# Patient Record
Sex: FEMALE | Race: WHITE | NOT HISPANIC OR LATINO | Employment: FULL TIME | URBAN - NONMETROPOLITAN AREA
[De-identification: names, ages, dates, MRNs, and addresses within clinical notes are randomized per-mention and may not be internally consistent; named-entity substitution may affect disease eponyms.]

---

## 2024-11-06 ENCOUNTER — APPOINTMENT (EMERGENCY)
Dept: RADIOLOGY | Facility: HOSPITAL | Age: 44
End: 2024-11-06
Payer: COMMERCIAL

## 2024-11-06 ENCOUNTER — HOSPITAL ENCOUNTER (EMERGENCY)
Facility: HOSPITAL | Age: 44
Discharge: HOME | End: 2024-11-06
Attending: EMERGENCY MEDICINE
Payer: COMMERCIAL

## 2024-11-06 VITALS
DIASTOLIC BLOOD PRESSURE: 63 MMHG | SYSTOLIC BLOOD PRESSURE: 99 MMHG | HEART RATE: 75 BPM | WEIGHT: 206.13 LBS | OXYGEN SATURATION: 99 % | RESPIRATION RATE: 15 BRPM | TEMPERATURE: 97.9 F

## 2024-11-06 DIAGNOSIS — R07.9 CHEST PAIN: ICD-10-CM

## 2024-11-06 DIAGNOSIS — E01.0 THYROMEGALY: ICD-10-CM

## 2024-11-06 DIAGNOSIS — R00.2 PALPITATIONS: Primary | ICD-10-CM

## 2024-11-06 LAB
ALBUMIN SERPL BCG-MCNC: 4.8 G/DL (ref 3.5–5)
ALP SERPL-CCNC: 96 U/L (ref 34–104)
ALT SERPL W P-5'-P-CCNC: 26 U/L (ref 7–52)
ANION GAP SERPL CALCULATED.3IONS-SCNC: 8 MMOL/L (ref 4–13)
AST SERPL W P-5'-P-CCNC: 23 U/L (ref 13–39)
ATRIAL RATE: 86 BPM
BASOPHILS # BLD AUTO: 0.05 THOUSANDS/ΜL (ref 0–0.1)
BASOPHILS NFR BLD AUTO: 1 % (ref 0–1)
BILIRUB SERPL-MCNC: 0.36 MG/DL (ref 0.2–1)
BNP SERPL-MCNC: 18 PG/ML (ref 0–100)
BUN SERPL-MCNC: 13 MG/DL (ref 5–25)
CALCIUM SERPL-MCNC: 9.8 MG/DL (ref 8.4–10.2)
CARDIAC TROPONIN I PNL SERPL HS: <2 NG/L
CHLORIDE SERPL-SCNC: 107 MMOL/L (ref 96–108)
CO2 SERPL-SCNC: 25 MMOL/L (ref 21–32)
CREAT SERPL-MCNC: 0.8 MG/DL (ref 0.6–1.3)
D DIMER PPP FEU-MCNC: 0.28 UG/ML FEU
EOSINOPHIL # BLD AUTO: 0.26 THOUSAND/ΜL (ref 0–0.61)
EOSINOPHIL NFR BLD AUTO: 3 % (ref 0–6)
ERYTHROCYTE [DISTWIDTH] IN BLOOD BY AUTOMATED COUNT: 13.5 % (ref 11.6–15.1)
GFR SERPL CREATININE-BSD FRML MDRD: 89 ML/MIN/1.73SQ M
GLUCOSE SERPL-MCNC: 101 MG/DL (ref 65–140)
HCT VFR BLD AUTO: 41.8 % (ref 34.8–46.1)
HGB BLD-MCNC: 13.7 G/DL (ref 11.5–15.4)
IMM GRANULOCYTES # BLD AUTO: 0.01 THOUSAND/UL (ref 0–0.2)
IMM GRANULOCYTES NFR BLD AUTO: 0 % (ref 0–2)
LYMPHOCYTES # BLD AUTO: 2.76 THOUSANDS/ΜL (ref 0.6–4.47)
LYMPHOCYTES NFR BLD AUTO: 36 % (ref 14–44)
MAGNESIUM SERPL-MCNC: 2.3 MG/DL (ref 1.9–2.7)
MCH RBC QN AUTO: 30.6 PG (ref 26.8–34.3)
MCHC RBC AUTO-ENTMCNC: 32.8 G/DL (ref 31.4–37.4)
MCV RBC AUTO: 94 FL (ref 82–98)
MONOCYTES # BLD AUTO: 0.63 THOUSAND/ΜL (ref 0.17–1.22)
MONOCYTES NFR BLD AUTO: 8 % (ref 4–12)
NEUTROPHILS # BLD AUTO: 3.99 THOUSANDS/ΜL (ref 1.85–7.62)
NEUTS SEG NFR BLD AUTO: 52 % (ref 43–75)
NRBC BLD AUTO-RTO: 0 /100 WBCS
P AXIS: 49 DEGREES
PLATELET # BLD AUTO: 286 THOUSANDS/UL (ref 149–390)
PMV BLD AUTO: 11.1 FL (ref 8.9–12.7)
POTASSIUM SERPL-SCNC: 3.8 MMOL/L (ref 3.5–5.3)
PR INTERVAL: 162 MS
PROT SERPL-MCNC: 7.8 G/DL (ref 6.4–8.4)
QRS AXIS: 19 DEGREES
QRSD INTERVAL: 82 MS
QT INTERVAL: 366 MS
QTC INTERVAL: 437 MS
RBC # BLD AUTO: 4.47 MILLION/UL (ref 3.81–5.12)
SODIUM SERPL-SCNC: 140 MMOL/L (ref 135–147)
T WAVE AXIS: 46 DEGREES
TSH SERPL DL<=0.05 MIU/L-ACNC: 4.03 UIU/ML (ref 0.45–4.5)
VENTRICULAR RATE: 86 BPM
WBC # BLD AUTO: 7.7 THOUSAND/UL (ref 4.31–10.16)

## 2024-11-06 PROCEDURE — 93005 ELECTROCARDIOGRAM TRACING: CPT

## 2024-11-06 PROCEDURE — 84443 ASSAY THYROID STIM HORMONE: CPT | Performed by: EMERGENCY MEDICINE

## 2024-11-06 PROCEDURE — 93010 ELECTROCARDIOGRAM REPORT: CPT | Performed by: INTERNAL MEDICINE

## 2024-11-06 PROCEDURE — 85025 COMPLETE CBC W/AUTO DIFF WBC: CPT | Performed by: EMERGENCY MEDICINE

## 2024-11-06 PROCEDURE — 36415 COLL VENOUS BLD VENIPUNCTURE: CPT | Performed by: EMERGENCY MEDICINE

## 2024-11-06 PROCEDURE — 99285 EMERGENCY DEPT VISIT HI MDM: CPT

## 2024-11-06 PROCEDURE — 71045 X-RAY EXAM CHEST 1 VIEW: CPT

## 2024-11-06 PROCEDURE — 83735 ASSAY OF MAGNESIUM: CPT | Performed by: EMERGENCY MEDICINE

## 2024-11-06 PROCEDURE — 85379 FIBRIN DEGRADATION QUANT: CPT | Performed by: EMERGENCY MEDICINE

## 2024-11-06 PROCEDURE — 99285 EMERGENCY DEPT VISIT HI MDM: CPT | Performed by: EMERGENCY MEDICINE

## 2024-11-06 PROCEDURE — 84484 ASSAY OF TROPONIN QUANT: CPT | Performed by: EMERGENCY MEDICINE

## 2024-11-06 PROCEDURE — 83880 ASSAY OF NATRIURETIC PEPTIDE: CPT | Performed by: EMERGENCY MEDICINE

## 2024-11-06 PROCEDURE — 80053 COMPREHEN METABOLIC PANEL: CPT | Performed by: EMERGENCY MEDICINE

## 2024-11-06 NOTE — Clinical Note
Keysha Figueroa was seen and treated in our emergency department on 11/6/2024.                Diagnosis:     Keysha  .    She may return on this date: 11/07/2024         If you have any questions or concerns, please don't hesitate to call.      Pilo Rea MD    ______________________________           _______________          _______________  Hospital Representative                              Date                                Time

## 2024-11-06 NOTE — ED PROVIDER NOTES
Time reflects when diagnosis was documented in both MDM as applicable and the Disposition within this note       Time User Action Codes Description Comment    11/6/2024 12:30 PM ReaPilo Add [R00.2] Palpitations     11/6/2024 12:31 PM ReaPilo Add [R07.9] Chest pain     11/6/2024 12:31 PM Pilo Rea Add [E01.0] Thyromegaly           ED Disposition       ED Disposition   Discharge    Condition   Stable    Date/Time   Wed Nov 6, 2024 12:30 PM    Comment   Keysha Figueroa discharge to home/self care.                   Assessment & Plan       Medical Decision Making  1131: Patient appears well, vital signs reviewed.  Plan to complete basic labs including cardiac enzymes, BNP, D-dimer, check EKG and chest x-ray.  Patient also noted to have some mild thyromegaly on exam, check TSH.  Placed on the monitor, plan to observe for any dysrhythmia.    1240: Chest x-ray and labs reviewed.  The patient has remained stable throughout ED course.  Stable for discharge.  Close follow-up with PCP.    Amount and/or Complexity of Data Reviewed  Labs: ordered.  Radiology: ordered and independent interpretation performed.     Details: Chest x-ray--no acute pathology  ECG/medicine tests: ordered and independent interpretation performed.     Details: Normal sinus rhythm 86 bpm, no acute ischemia, normal intervals.             Medications - No data to display    ED Risk Strat Scores                           SBIRT 22yo+      Flowsheet Row Most Recent Value   Initial Alcohol Screen: US AUDIT-C     1. How often do you have a drink containing alcohol? 0 Filed at: 11/06/2024 1132   2. How many drinks containing alcohol do you have on a typical day you are drinking?  0 Filed at: 11/06/2024 1132   3a. Male UNDER 65: How often do you have five or more drinks on one occasion? 0 Filed at: 11/06/2024 1132   3b. FEMALE Any Age, or MALE 65+: How often do you have 4 or more drinks on one occassion? 0 Filed at: 11/06/2024 1132   Audit-C  Score 0 Filed at: 11/06/2024 1132   EMMA: How many times in the past year have you...    Used an illegal drug or used a prescription medication for non-medical reasons? Never Filed at: 11/06/2024 1132                            History of Present Illness       Chief Complaint   Patient presents with    Palpitations     Patient c/o feels fluttering in chest as though heart is skipping a beat. Patient stated when this happens she can't catch her breathe.        History reviewed. No pertinent past medical history.   Past Surgical History:   Procedure Laterality Date    HYSTERECTOMY        History reviewed. No pertinent family history.   Social History     Tobacco Use    Smoking status: Never     Passive exposure: Never    Smokeless tobacco: Never   Vaping Use    Vaping status: Never Used   Substance Use Topics    Alcohol use: Not Currently    Drug use: Not Currently      E-Cigarette/Vaping    E-Cigarette Use Never User       E-Cigarette/Vaping Substances      I have reviewed and agree with the history as documented.       History provided by:  Medical records and patient  Palpitations  Palpitations quality:  Irregular  Onset quality:  Gradual  Duration:  1 week  Timing:  Intermittent  Progression:  Waxing and waning  Chronicity:  Recurrent  Context comment:  Patient reports having palpitations for the past week, states skipping a beat and fluttering at times, associated with some shortness of breath when she has the palpitations.  Called her PCP today, directed to go to the emergency room  Relieved by:  Nothing  Worsened by:  Nothing  Ineffective treatments:  None tried  Associated symptoms: chest pain and shortness of breath    Associated symptoms: no back pain, no chest pressure, no cough, no diaphoresis, no dizziness, no hemoptysis, no leg pain, no lower extremity edema, no malaise/fatigue, no nausea, no near-syncope, no numbness, no orthopnea, no PND, no syncope, no vomiting and no weakness    Risk factors: no hx  of atrial fibrillation, no hx of DVT and no hx of PE        Review of Systems   Constitutional:  Negative for chills, diaphoresis, fatigue, fever and malaise/fatigue.   HENT:  Negative for ear discharge, ear pain, rhinorrhea and sore throat.    Eyes:  Negative for pain and visual disturbance.   Respiratory:  Positive for shortness of breath. Negative for cough and hemoptysis.    Cardiovascular:  Positive for chest pain and palpitations. Negative for orthopnea, syncope, PND and near-syncope.   Gastrointestinal:  Negative for abdominal pain, diarrhea, nausea and vomiting.   Endocrine: Negative for polydipsia, polyphagia and polyuria.   Genitourinary:  Negative for difficulty urinating, dysuria, flank pain and hematuria.   Musculoskeletal:  Negative for arthralgias and back pain.   Skin:  Negative for color change and rash.   Allergic/Immunologic: Negative for immunocompromised state.   Neurological:  Negative for dizziness, seizures, syncope, weakness, numbness and headaches.   Psychiatric/Behavioral:  Negative for confusion and self-injury. The patient is not nervous/anxious.    All other systems reviewed and are negative.          Objective       ED Triage Vitals [11/06/24 1126]   Temperature Pulse Blood Pressure Respirations SpO2 Patient Position - Orthostatic VS   97.9 °F (36.6 °C) 89 105/61 18 99 % Lying      Temp Source Heart Rate Source BP Location FiO2 (%) Pain Score    Temporal Monitor Left arm -- --      Vitals      Date and Time Temp Pulse SpO2 Resp BP Pain Score FACES Pain Rating User   11/06/24 1230 -- 75 99 % 15 99/63 -- -- ACM   11/06/24 1126 97.9 °F (36.6 °C) 89 99 % 18 105/61 -- -- AFG            Physical Exam  Vitals and nursing note reviewed.   Constitutional:       General: She is not in acute distress.     Appearance: Normal appearance. She is not ill-appearing, toxic-appearing or diaphoretic.   HENT:      Head: Normocephalic and atraumatic.      Nose: Nose normal. No congestion or rhinorrhea.       Mouth/Throat:      Mouth: Mucous membranes are moist.      Pharynx: Oropharynx is clear. No oropharyngeal exudate or posterior oropharyngeal erythema.   Eyes:      General:         Right eye: No discharge.         Left eye: No discharge.   Neck:      Comments: Generalized thyromegaly, nontender  Cardiovascular:      Rate and Rhythm: Normal rate and regular rhythm.      Pulses: Normal pulses.      Heart sounds: Normal heart sounds. No murmur heard.     No gallop.   Pulmonary:      Effort: Pulmonary effort is normal. No respiratory distress.      Breath sounds: Normal breath sounds. No stridor. No wheezing, rhonchi or rales.   Chest:      Chest wall: No tenderness.   Abdominal:      General: Bowel sounds are normal. There is no distension.      Palpations: Abdomen is soft. There is no mass.      Tenderness: There is no abdominal tenderness. There is no right CVA tenderness, left CVA tenderness, guarding or rebound.      Hernia: No hernia is present.   Musculoskeletal:         General: Normal range of motion.      Cervical back: Normal range of motion and neck supple. No rigidity or tenderness.   Lymphadenopathy:      Cervical: No cervical adenopathy.   Skin:     General: Skin is warm and dry.      Capillary Refill: Capillary refill takes less than 2 seconds.   Neurological:      General: No focal deficit present.      Mental Status: She is alert and oriented to person, place, and time.      Cranial Nerves: No cranial nerve deficit.      Sensory: No sensory deficit.      Motor: No weakness.      Coordination: Coordination normal.      Gait: Gait normal.      Deep Tendon Reflexes: Reflexes normal.   Psychiatric:         Mood and Affect: Mood normal.         Behavior: Behavior normal.         Thought Content: Thought content normal.         Judgment: Judgment normal.         Results Reviewed       Procedure Component Value Units Date/Time    TSH, 3rd generation with Free T4 reflex [[id number]]  (Normal) Collected:  11/06/24 1139    Lab Status: Final result Specimen: Blood from Arm, Right Updated: 11/06/24 1252     TSH 3RD GENERATON 4.029 uIU/mL     HS Troponin 0hr (reflex protocol) [[id number]]  (Normal) Collected: 11/06/24 1139    Lab Status: Final result Specimen: Blood from Arm, Right Updated: 11/06/24 1224     hs TnI 0hr <2 ng/L     B-Type Natriuretic Peptide(BNP) [[id number]]  (Normal) Collected: 11/06/24 1139    Lab Status: Final result Specimen: Blood from Arm, Right Updated: 11/06/24 1223     BNP 18 pg/mL     Comprehensive metabolic panel [[id number]] Collected: 11/06/24 1139    Lab Status: Final result Specimen: Blood from Arm, Right Updated: 11/06/24 1220     Sodium 140 mmol/L      Potassium 3.8 mmol/L      Chloride 107 mmol/L      CO2 25 mmol/L      ANION GAP 8 mmol/L      BUN 13 mg/dL      Creatinine 0.80 mg/dL      Glucose 101 mg/dL      Calcium 9.8 mg/dL      AST 23 U/L      ALT 26 U/L      Alkaline Phosphatase 96 U/L      Total Protein 7.8 g/dL      Albumin 4.8 g/dL      Total Bilirubin 0.36 mg/dL      eGFR 89 ml/min/1.73sq m     Narrative:      National Kidney Disease Foundation guidelines for Chronic Kidney Disease (CKD):     Stage 1 with normal or high GFR (GFR > 90 mL/min/1.73 square meters)    Stage 2 Mild CKD (GFR = 60-89 mL/min/1.73 square meters)    Stage 3A Moderate CKD (GFR = 45-59 mL/min/1.73 square meters)    Stage 3B Moderate CKD (GFR = 30-44 mL/min/1.73 square meters)    Stage 4 Severe CKD (GFR = 15-29 mL/min/1.73 square meters)    Stage 5 End Stage CKD (GFR <15 mL/min/1.73 square meters)  Note: GFR calculation is accurate only with a steady state creatinine    Magnesium [[id number]]  (Normal) Collected: 11/06/24 1139    Lab Status: Final result Specimen: Blood from Arm, Right Updated: 11/06/24 1220     Magnesium 2.3 mg/dL     D-Dimer [[id number]]  (Normal) Collected: 11/06/24 1139    Lab Status: Final result Specimen: Blood from Arm, Right Updated: 11/06/24 1214     D-Dimer, Quant 0.28 ug/ml FEU      CBC and differential [[id number]] Collected: 11/06/24 1139    Lab Status: Final result Specimen: Blood from Arm, Right Updated: 11/06/24 1152     WBC 7.70 Thousand/uL      RBC 4.47 Million/uL      Hemoglobin 13.7 g/dL      Hematocrit 41.8 %      MCV 94 fL      MCH 30.6 pg      MCHC 32.8 g/dL      RDW 13.5 %      MPV 11.1 fL      Platelets 286 Thousands/uL      nRBC 0 /100 WBCs      Segmented % 52 %      Immature Grans % 0 %      Lymphocytes % 36 %      Monocytes % 8 %      Eosinophils Relative 3 %      Basophils Relative 1 %      Absolute Neutrophils 3.99 Thousands/µL      Absolute Immature Grans 0.01 Thousand/uL      Absolute Lymphocytes 2.76 Thousands/µL      Absolute Monocytes 0.63 Thousand/µL      Eosinophils Absolute 0.26 Thousand/µL      Basophils Absolute 0.05 Thousands/µL             XR chest 1 view portable    (Results Pending)       Procedures    ED Medication and Procedure Management   None     There are no discharge medications for this patient.    No discharge procedures on file.  ED SEPSIS DOCUMENTATION   Time reflects when diagnosis was documented in both MDM as applicable and the Disposition within this note       Time User Action Codes Description Comment    11/6/2024 12:30 PM Pilo Rea Add [R00.2] Palpitations     11/6/2024 12:31 PM Pilo Rea Add [R07.9] Chest pain     11/6/2024 12:31 PM Pilo Rea Add [E01.0] Thyromegaly                  Pilo Rea MD  11/06/24 2397

## 2024-11-06 NOTE — Clinical Note
Keysha Figueroa was seen and treated in our emergency department on 11/6/2024.                Diagnosis:     Keysha  may return to work on return date.    She may return on this date: 11/07/2024         If you have any questions or concerns, please don't hesitate to call.      Pilo Rea MD    ______________________________           _______________          _______________  Hospital Representative                              Date                                Time

## 2025-03-10 ENCOUNTER — HOSPITAL ENCOUNTER (EMERGENCY)
Facility: HOSPITAL | Age: 45
Discharge: HOME | End: 2025-03-10
Attending: EMERGENCY MEDICINE
Payer: COMMERCIAL

## 2025-03-10 ENCOUNTER — APPOINTMENT (EMERGENCY)
Dept: CT IMAGING | Facility: HOSPITAL | Age: 45
End: 2025-03-10
Payer: COMMERCIAL

## 2025-03-10 VITALS
RESPIRATION RATE: 13 BRPM | OXYGEN SATURATION: 96 % | DIASTOLIC BLOOD PRESSURE: 61 MMHG | HEART RATE: 80 BPM | TEMPERATURE: 97.6 F | SYSTOLIC BLOOD PRESSURE: 111 MMHG

## 2025-03-10 DIAGNOSIS — R55 SYNCOPE: Primary | ICD-10-CM

## 2025-03-10 LAB
ALBUMIN SERPL BCG-MCNC: 4.6 G/DL (ref 3.5–5)
ALP SERPL-CCNC: 100 U/L (ref 34–104)
ALT SERPL W P-5'-P-CCNC: 25 U/L (ref 7–52)
ANION GAP SERPL CALCULATED.3IONS-SCNC: 7 MMOL/L (ref 4–13)
AST SERPL W P-5'-P-CCNC: 24 U/L (ref 13–39)
ATRIAL RATE: 95 BPM
BASOPHILS # BLD AUTO: 0.03 THOUSANDS/ÂΜL (ref 0–0.1)
BASOPHILS NFR BLD AUTO: 0 % (ref 0–1)
BILIRUB SERPL-MCNC: 0.31 MG/DL (ref 0.2–1)
BNP SERPL-MCNC: 11 PG/ML (ref 0–100)
BUN SERPL-MCNC: 15 MG/DL (ref 5–25)
CALCIUM SERPL-MCNC: 9.6 MG/DL (ref 8.4–10.2)
CARDIAC TROPONIN I PNL SERPL HS: <2 NG/L (ref ?–50)
CHLORIDE SERPL-SCNC: 104 MMOL/L (ref 96–108)
CO2 SERPL-SCNC: 25 MMOL/L (ref 21–32)
CREAT SERPL-MCNC: 0.73 MG/DL (ref 0.6–1.3)
EOSINOPHIL # BLD AUTO: 0.13 THOUSAND/ÂΜL (ref 0–0.61)
EOSINOPHIL NFR BLD AUTO: 2 % (ref 0–6)
ERYTHROCYTE [DISTWIDTH] IN BLOOD BY AUTOMATED COUNT: 13.4 % (ref 11.6–15.1)
GFR SERPL CREATININE-BSD FRML MDRD: 99 ML/MIN/1.73SQ M
GLUCOSE SERPL-MCNC: 116 MG/DL (ref 65–140)
HCT VFR BLD AUTO: 40.7 % (ref 34.8–46.1)
HGB BLD-MCNC: 14 G/DL (ref 11.5–15.4)
IMM GRANULOCYTES # BLD AUTO: 0.01 THOUSAND/UL (ref 0–0.2)
IMM GRANULOCYTES NFR BLD AUTO: 0 % (ref 0–2)
LIPASE SERPL-CCNC: 32 U/L (ref 11–82)
LYMPHOCYTES # BLD AUTO: 1.2 THOUSANDS/ÂΜL (ref 0.6–4.47)
LYMPHOCYTES NFR BLD AUTO: 14 % (ref 14–44)
MCH RBC QN AUTO: 31.7 PG (ref 26.8–34.3)
MCHC RBC AUTO-ENTMCNC: 34.4 G/DL (ref 31.4–37.4)
MCV RBC AUTO: 92 FL (ref 82–98)
MONOCYTES # BLD AUTO: 0.71 THOUSAND/ÂΜL (ref 0.17–1.22)
MONOCYTES NFR BLD AUTO: 8 % (ref 4–12)
NEUTROPHILS # BLD AUTO: 6.61 THOUSANDS/ÂΜL (ref 1.85–7.62)
NEUTS SEG NFR BLD AUTO: 76 % (ref 43–75)
NRBC BLD AUTO-RTO: 0 /100 WBCS
P AXIS: 44 DEGREES
PLATELET # BLD AUTO: 261 THOUSANDS/UL (ref 149–390)
PMV BLD AUTO: 11.4 FL (ref 8.9–12.7)
POTASSIUM SERPL-SCNC: 4.1 MMOL/L (ref 3.5–5.3)
PR INTERVAL: 160 MS
PROT SERPL-MCNC: 7.6 G/DL (ref 6.4–8.4)
QRS AXIS: 36 DEGREES
QRSD INTERVAL: 78 MS
QT INTERVAL: 354 MS
QTC INTERVAL: 444 MS
RBC # BLD AUTO: 4.42 MILLION/UL (ref 3.81–5.12)
SODIUM SERPL-SCNC: 136 MMOL/L (ref 135–147)
T WAVE AXIS: 52 DEGREES
VENTRICULAR RATE: 95 BPM
WBC # BLD AUTO: 8.69 THOUSAND/UL (ref 4.31–10.16)

## 2025-03-10 PROCEDURE — 84484 ASSAY OF TROPONIN QUANT: CPT | Performed by: EMERGENCY MEDICINE

## 2025-03-10 PROCEDURE — 93005 ELECTROCARDIOGRAM TRACING: CPT

## 2025-03-10 PROCEDURE — 71275 CT ANGIOGRAPHY CHEST: CPT

## 2025-03-10 PROCEDURE — 96374 THER/PROPH/DIAG INJ IV PUSH: CPT

## 2025-03-10 PROCEDURE — 80053 COMPREHEN METABOLIC PANEL: CPT | Performed by: EMERGENCY MEDICINE

## 2025-03-10 PROCEDURE — 83690 ASSAY OF LIPASE: CPT | Performed by: EMERGENCY MEDICINE

## 2025-03-10 PROCEDURE — 99284 EMERGENCY DEPT VISIT MOD MDM: CPT

## 2025-03-10 PROCEDURE — 83880 ASSAY OF NATRIURETIC PEPTIDE: CPT | Performed by: EMERGENCY MEDICINE

## 2025-03-10 PROCEDURE — 93010 ELECTROCARDIOGRAM REPORT: CPT | Performed by: INTERNAL MEDICINE

## 2025-03-10 PROCEDURE — 36415 COLL VENOUS BLD VENIPUNCTURE: CPT | Performed by: EMERGENCY MEDICINE

## 2025-03-10 PROCEDURE — 70450 CT HEAD/BRAIN W/O DYE: CPT

## 2025-03-10 PROCEDURE — 99285 EMERGENCY DEPT VISIT HI MDM: CPT | Performed by: EMERGENCY MEDICINE

## 2025-03-10 PROCEDURE — 96361 HYDRATE IV INFUSION ADD-ON: CPT

## 2025-03-10 PROCEDURE — 74177 CT ABD & PELVIS W/CONTRAST: CPT

## 2025-03-10 PROCEDURE — 85025 COMPLETE CBC W/AUTO DIFF WBC: CPT | Performed by: EMERGENCY MEDICINE

## 2025-03-10 RX ORDER — ONDANSETRON 2 MG/ML
4 INJECTION INTRAMUSCULAR; INTRAVENOUS ONCE
Status: COMPLETED | OUTPATIENT
Start: 2025-03-10 | End: 2025-03-10

## 2025-03-10 RX ADMIN — SODIUM CHLORIDE 1000 ML: 0.9 INJECTION, SOLUTION INTRAVENOUS at 03:05

## 2025-03-10 RX ADMIN — IOHEXOL 100 ML: 350 INJECTION, SOLUTION INTRAVENOUS at 04:02

## 2025-03-10 RX ADMIN — ONDANSETRON 4 MG: 2 INJECTION INTRAMUSCULAR; INTRAVENOUS at 03:06

## 2025-03-10 RX ADMIN — AMOXICILLIN AND CLAVULANATE POTASSIUM 1 TABLET: 875; 125 TABLET, COATED ORAL at 05:03

## 2025-03-10 NOTE — ED PROVIDER NOTES
Time reflects when diagnosis was documented in both MDM as applicable and the Disposition within this note       Time User Action Codes Description Comment    3/10/2025  4:39 AM Xu Longo Add [R55] Syncope           ED Disposition       ED Disposition   Discharge    Condition   Stable    Date/Time   Mon Mar 10, 2025  4:39 AM    Comment   Keysha Figueroa discharge to home/self care.                   Assessment & Plan       Medical Decision Making  44-year-old female presents to the emergency department with syncope and collapse, differential diagnosis include gastritis, gastroenteritis, pulmonary embolism, ACS, pancreatitis, cholecystitis, choledocholithiasis, diverticulitis, dehydration, JACKSON, vasovagal syncope, intracranial process, will perform CT head, chest abdomen pelvis, basic labs, and reassess.    Amount and/or Complexity of Data Reviewed  Labs: ordered.  Radiology: ordered.    Risk  Prescription drug management.        ED Course as of 03/10/25 0555   Mon Mar 10, 2025   0441 Discussed results with the patient.  She will follow-up with cardiology outpatient.  Strict return precautions given.  Patient understands and agrees with treatment plan.       Medications   sodium chloride 0.9 % bolus 1,000 mL (0 mL Intravenous Stopped 3/10/25 0405)   ondansetron (ZOFRAN) injection 4 mg (4 mg Intravenous Given 3/10/25 0306)   iohexol (OMNIPAQUE) 350 MG/ML injection (MULTI-DOSE) 100 mL (100 mL Intravenous Given 3/10/25 0402)   amoxicillin-clavulanate (AUGMENTIN) 875-125 mg per tablet 1 tablet (1 tablet Oral Given 3/10/25 0503)       ED Risk Strat Scores      HEART Risk Score      Flowsheet Row Most Recent Value   Heart Score Risk Calculator    History 0 Filed at: 03/10/2025 0305   ECG 0 Filed at: 03/10/2025 0305   Age 0 Filed at: 03/10/2025 0305   Risk Factors 0 Filed at: 03/10/2025 0305   Troponin 0 Filed at: 03/10/2025 0305   HEART Score 0 Filed at: 03/10/2025 0305                                                   History of Present Illness       Chief Complaint   Patient presents with    Syncope     States she was vomiting and had several episodes of diarrhea early in the am yesterday. Pt reports going into the bathroom tonight and was standing at her sink and only remembers waking up on her bathroom floor surrounded by her family. Denies any pain or injury       History reviewed. No pertinent past medical history.   Past Surgical History:   Procedure Laterality Date    HYSTERECTOMY        History reviewed. No pertinent family history.   Social History     Tobacco Use    Smoking status: Never     Passive exposure: Never    Smokeless tobacco: Never   Vaping Use    Vaping status: Never Used   Substance Use Topics    Alcohol use: Not Currently    Drug use: Not Currently      E-Cigarette/Vaping    E-Cigarette Use Never User       E-Cigarette/Vaping Substances      I have reviewed and agree with the history as documented.     44-year-old female with past medical history listed below presents to the emergency department with multiple episodes of nausea, vomiting, and diarrhea that started in the a.m. yesterday.  Patient states that she had episode at 1 AM where she went to the bathroom again, and was standing up, and then woke up on the floor.  Patient denies any chills, fevers.  Patient reports some chest pressure.  She denies any shortness of breath.  She states that she is having epigastric abdominal discomfort.  She denies any bloody stool, bloody urine, or pain with urination.  Patient had a recent Holter monitor, however she was lost to follow-up with her cardiologist.      Syncope  Associated symptoms: chest pain, nausea and vomiting    Associated symptoms: no fever, no palpitations, no seizures and no shortness of breath        Review of Systems   Constitutional:  Negative for chills and fever.   HENT:  Negative for ear pain and sore throat.    Eyes:  Negative for pain and visual disturbance.   Respiratory:  Negative  for cough and shortness of breath.    Cardiovascular:  Positive for chest pain and syncope. Negative for palpitations.   Gastrointestinal:  Positive for abdominal pain, nausea and vomiting.   Genitourinary:  Negative for dysuria and hematuria.   Musculoskeletal:  Negative for arthralgias and back pain.   Skin:  Negative for color change and rash.   Neurological:  Positive for syncope. Negative for seizures.   All other systems reviewed and are negative.          Objective       ED Triage Vitals   Temperature Pulse Blood Pressure Respirations SpO2 Patient Position - Orthostatic VS   03/10/25 0258 03/10/25 0257 03/10/25 0257 03/10/25 0257 03/10/25 0257 03/10/25 0257   97.6 °F (36.4 °C) 96 128/79 18 95 % Sitting      Temp src Heart Rate Source BP Location FiO2 (%) Pain Score    -- 03/10/25 0257 03/10/25 0257 -- 03/10/25 0257     Monitor Right arm  No Pain      Vitals      Date and Time Temp Pulse SpO2 Resp BP Pain Score FACES Pain Rating User   03/10/25 0330 -- 80 96 % 13 111/61 -- -- BA   03/10/25 0258 97.6 °F (36.4 °C) -- -- -- -- -- -- AH   03/10/25 0257 -- 96 95 % 18 128/79 No Pain --             Physical Exam  Vitals and nursing note reviewed.   Constitutional:       General: She is not in acute distress.     Appearance: She is well-developed.   HENT:      Head: Normocephalic and atraumatic.   Eyes:      Conjunctiva/sclera: Conjunctivae normal.   Cardiovascular:      Rate and Rhythm: Normal rate and regular rhythm.   Pulmonary:      Effort: Pulmonary effort is normal. No respiratory distress.      Breath sounds: Normal breath sounds.   Abdominal:      Palpations: Abdomen is soft.      Tenderness: There is abdominal tenderness.   Musculoskeletal:         General: No swelling.      Cervical back: Neck supple.   Skin:     General: Skin is warm and dry.      Capillary Refill: Capillary refill takes less than 2 seconds.   Neurological:      Mental Status: She is alert.   Psychiatric:         Mood and Affect: Mood  normal.         Results Reviewed       Procedure Component Value Units Date/Time    B-Type Natriuretic Peptide(BNP) [[id number]]  (Normal) Collected: 03/10/25 0307    Lab Status: Final result Specimen: Blood from Arm, Right Updated: 03/10/25 0346     BNP 11 pg/mL     HS Troponin 0hr (reflex protocol) [[id number]]  (Normal) Collected: 03/10/25 0307    Lab Status: Final result Specimen: Blood from Arm, Right Updated: 03/10/25 0340     hs TnI 0hr <2 ng/L     Comprehensive metabolic panel [[id number]] Collected: 03/10/25 0307    Lab Status: Final result Specimen: Blood from Arm, Right Updated: 03/10/25 0337     Sodium 136 mmol/L      Potassium 4.1 mmol/L      Chloride 104 mmol/L      CO2 25 mmol/L      ANION GAP 7 mmol/L      BUN 15 mg/dL      Creatinine 0.73 mg/dL      Glucose 116 mg/dL      Calcium 9.6 mg/dL      AST 24 U/L      ALT 25 U/L      Alkaline Phosphatase 100 U/L      Total Protein 7.6 g/dL      Albumin 4.6 g/dL      Total Bilirubin 0.31 mg/dL      eGFR 99 ml/min/1.73sq m     Narrative:      National Kidney Disease Foundation guidelines for Chronic Kidney Disease (CKD):     Stage 1 with normal or high GFR (GFR > 90 mL/min/1.73 square meters)    Stage 2 Mild CKD (GFR = 60-89 mL/min/1.73 square meters)    Stage 3A Moderate CKD (GFR = 45-59 mL/min/1.73 square meters)    Stage 3B Moderate CKD (GFR = 30-44 mL/min/1.73 square meters)    Stage 4 Severe CKD (GFR = 15-29 mL/min/1.73 square meters)    Stage 5 End Stage CKD (GFR <15 mL/min/1.73 square meters)  Note: GFR calculation is accurate only with a steady state creatinine    Lipase [[id number]]  (Normal) Collected: 03/10/25 0307    Lab Status: Final result Specimen: Blood from Arm, Right Updated: 03/10/25 0337     Lipase 32 u/L     CBC and differential [[id number]]  (Abnormal) Collected: 03/10/25 0307    Lab Status: Final result Specimen: Blood from Arm, Right Updated: 03/10/25 0316     WBC 8.69 Thousand/uL      RBC 4.42 Million/uL      Hemoglobin 14.0 g/dL       Hematocrit 40.7 %      MCV 92 fL      MCH 31.7 pg      MCHC 34.4 g/dL      RDW 13.4 %      MPV 11.4 fL      Platelets 261 Thousands/uL      nRBC 0 /100 WBCs      Segmented % 76 %      Immature Grans % 0 %      Lymphocytes % 14 %      Monocytes % 8 %      Eosinophils Relative 2 %      Basophils Relative 0 %      Absolute Neutrophils 6.61 Thousands/µL      Absolute Immature Grans 0.01 Thousand/uL      Absolute Lymphocytes 1.20 Thousands/µL      Absolute Monocytes 0.71 Thousand/µL      Eosinophils Absolute 0.13 Thousand/µL      Basophils Absolute 0.03 Thousands/µL             CT head without contrast   Final Interpretation by Gabriel Weston MD (03/10 0423)      No acute intracranial abnormality.                  Workstation performed: [id number]         CT pe study w abdomen pelvis w contrast   Final Interpretation by Gabriel Weston MD (03/10 0435)      1.  No acute pulmonary embolism or thoracic aortic aneurysm/dissection.   2.  Very subtle small areas of hazy groundglass opacity in the bilateral lower lobes noted which may reflect an atypical infectious/inflammatory process of the lung parenchyma, recommend clinical correlation.   3.  Findings suggestive of mild nonspecific enterocolitis noted. No evidence of bowel obstruction, appendicitis, obstructive uropathy, free air, or free fluid.   4.  Minimal haziness of the central abdominal mesentery surrounding multiple subcentimeter mesenteric lymph nodes are seen. Findings are nonspecific but could reflect mesenteric panniculitis/sclerosing mesenteritis.                     Workstation performed: [id number]             Procedures    ED Medication and Procedure Management   None     Discharge Medication List as of 3/10/2025  4:41 AM        START taking these medications    Details   amoxicillin-clavulanate (AUGMENTIN) 875-125 mg per tablet Take 1 tablet by mouth every 12 (twelve) hours for 7 days, Starting Mon 3/10/2025, Until Mon 3/17/2025, Normal             ED SEPSIS  DOCUMENTATION   Time reflects when diagnosis was documented in both MDM as applicable and the Disposition within this note       Time User Action Codes Description Comment    3/10/2025  4:39 AM Xu Longo Add [R55] Rachael Longo DO  03/10/25 0555

## 2025-03-10 NOTE — DISCHARGE INSTRUCTIONS
You were seen in the emergency department for syncope and collapse, your CT head, EKG, and blood work are unremarkable, your CT scan results of your chest abdomen pelvis are below, we have provided you with an ambulatory referral to cardiology, please call their office and schedule an appointment.  They are expecting your phone call.  We have also provided you with an antibiotic at the chance that you have a pneumonia.  Please pick it over the pharmacy and take it as directed.  If your symptoms worsen or persist, please return to the emergency department immediately.    IMPRESSION:     1.  No acute pulmonary embolism or thoracic aortic aneurysm/dissection.  2.  Very subtle small areas of hazy groundglass opacity in the bilateral lower lobes noted which may reflect an atypical infectious/inflammatory process of the lung parenchyma, recommend clinical correlation.  3.  Findings suggestive of mild nonspecific enterocolitis noted. No evidence of bowel obstruction, appendicitis, obstructive uropathy, free air, or free fluid.  4.  Minimal haziness of the central abdominal mesentery surrounding multiple subcentimeter mesenteric lymph nodes are seen. Findings are nonspecific but could reflect mesenteric panniculitis/sclerosing mesenteritis.

## 2025-05-16 ENCOUNTER — OFFICE VISIT (OUTPATIENT)
Dept: CARDIOLOGY CLINIC | Facility: CLINIC | Age: 45
End: 2025-05-16
Payer: COMMERCIAL

## 2025-05-16 VITALS
SYSTOLIC BLOOD PRESSURE: 102 MMHG | HEART RATE: 68 BPM | HEIGHT: 63 IN | DIASTOLIC BLOOD PRESSURE: 60 MMHG | WEIGHT: 197 LBS | TEMPERATURE: 97.8 F | BODY MASS INDEX: 34.91 KG/M2 | OXYGEN SATURATION: 98 %

## 2025-05-16 DIAGNOSIS — I49.3 PVC (PREMATURE VENTRICULAR CONTRACTION): ICD-10-CM

## 2025-05-16 DIAGNOSIS — R55 VASOVAGAL SYNCOPE: Primary | ICD-10-CM

## 2025-05-16 DIAGNOSIS — E78.3 MIXED HYPERGLYCERIDEMIA: ICD-10-CM

## 2025-05-16 PROCEDURE — 99244 OFF/OP CNSLTJ NEW/EST MOD 40: CPT | Performed by: INTERNAL MEDICINE

## 2025-05-16 NOTE — PROGRESS NOTES
[facility] [place]  [address]  [address]  [facility] [id number] [phone]      Patient name: Keysha Figueroa   YOB: 1980   MR no: [id number]        Diagnosis ICD-10-CM Associated Orders   1. Vasovagal syncope  R55       2. Mixed hyperglyceridemia  E78.3 Lipid panel      3. PVC (premature ventricular contraction)  I49.3            Assessment and Recommendations:    1. Vasovagal syncope  2. Mixed hyperglyceridemia  -     Lipid panel; Future; Expected date: Collect anytime  3. PVC (premature ventricular contraction)     Her history is very typical for a vasovagal syncopal episode.  No suggestion of any cardiac arrhythmia etiology.  She has had no recurrent symptoms since then.  No additional testing is indicated at this time.  Lipids from 2021 showed that she had moderate familial heterozygous mixed hyperlipidemia.  Patient does admit that she was advised to start statins, however she developed myalgias and stopped it and is not keen on starting it again.  I have advised her to at least get a most recent lipid profile and if her LDL numbers are over 130, based on her age, she may at least consider ezetimibe 10 mg daily.  Even though she had frequent PVCs during her Holter monitor and November 2024, she is completely asymptomatic at this time and her borderline blood pressure does not allow for any beta-blockers to be initiated.  Continue to monitor clinically.  Needs more aggressive dietary modification for weight loss and lipid improvement.  I will be in touch with the patient once have reviewed the results of her lipid profile.  Return to cardiology as needed.    CHIEF COMPLAINT:  Syncope    HPI:  44-year-old female with past medical history significant for familial heterozygous mixed hyperlipidemia, presents for her first visit to cardiology.  The reason for this visit is her recent episode of syncope.  Review of records reveals the  patient was seen in the emergency room in 2025 with an episode of syncope.  She reports that she developed upset stomach with nausea and several episodes of vomiting and during that episode while she was standing in the bathroom trying to vomit, she had a brief syncopal episode.  No injury reported.  She reports no recurrent syncope after that.  She reports no chest pain, dyspnea on exertion.  Review of record also revealed that she was seen in the emergency room for palpitations in 2024.  Subsequently she had a 24-hour Holter monitor which showed 773 PVCs and some PACs but no SVT or NSVT.  She reports no palpitations since then.  She was under a lot of stress at that time.    History reviewed. No pertinent past medical history.     Past Surgical History:   Procedure Laterality Date    HYSTERECTOMY     , Carpal tunnel.    Social History[1]  Works at a  home.   History reviewed. No pertinent family history.  No premature CAD in parents or siblings.     Allergies   Allergen Reactions    Celecoxib Hives    Estrogens Myalgia     Patch    Oxycodone Itching       Current Medications[2]     Lab Results   Component Value Date    CREATININE 0.73 03/10/2025    CREATININE 0.80 2024    CREATININE 0.72 2021    K 4.1 03/10/2025    K 3.8 2024    K 4.1 2021    SODIUM 136 03/10/2025    SODIUM 140 2024    SODIUM 138 2021    TSH 4.77 (H) 2021       I have personally reviewed the ECG March 3, 2025 which showed normal sinus rhythm with no other abnormality.      REVIEW OF SYSTEMS   Positive for: Stress and anxiety  Negative for: All remaining as reviewed below and in HPI.    SYSTEM SYMPTOMS REVIEWED:  General--weight change, fever, night sweats  Respiratory--cough, wheezing, shortness of breath, sputum production  Cardiovascular--chest pain, syncope, dyspnea on exertion, edema, decline in exercise tolerance, claudication   Gastrointestinal--persistent vomiting,  "diarrhea, abdominal distention, blood in stool   Muscular or skeletal--joint pain or swelling   Neurologic--headaches, syncope, abnormal movement  Hematologic--history of easy bruising and bleeding   Endocrine--thyroid enlargement, heat or cold intolerance, polyuria   Psychiatric--anxiety, depression     Vitals:    05/16/25 0828   BP: 102/60   Pulse: 68   Temp: 97.8 °F (36.6 °C)   SpO2: 98%   Weight: 89.4 kg (197 lb)   Height: 5' 3\" (1.6 m)       Wt Readings from Last 3 Encounters:   05/16/25 89.4 kg (197 lb)   11/06/24 93.5 kg (206 lb 2.1 oz)        Body mass index is 34.9 kg/m².     General physical examination:    General appearance: Alert, no acute distress, appears stated age.  Moderate obesity  HEENT: Mucous membranes are moist.  No obvious abnormality noted.  Neck: Supple with no lymphadenopathy.  No JVD.  Carotid pulses are intact.  No carotid bruit.  Cardiovascular system: Regular rhythm.  Normal S1 and S2.  No murmurs.  No rubs or gallops. Extremities: No edema. No cyanosis.  Pulmonary: Respirations unlabored.  Good air entry bilaterally.  Clear to auscultation bilaterally.  Gastrointestinal: Abdomen is soft and nontender.  Bowel sounds are positive.  Musculoskeletal: Upper Extremities: Normal upper motor strength. Lower Extremity: Normal motor strength. Gait: Normal.   Skin: Skin is warm. No rashes or lesions.  Neurological: Patient is alert and oriented with no gross motor deficits.  Psychiatric: Mood is normal.  Behavior is normal.        Thank you for allowing me to be a part of this patient's care. If you have further questions, please feel free to contact me.    Kg Fountain MD, FAC, PAULA    Portions of the record  have been created with voice recognition software.  Occasional grammatical mistakes or wrong word or \"sound alike\" substitutions may have occurred due to the inherent limitations of voice recognition software. Please reach out to me directly for any clarifications.          [1]   Social " History  Tobacco Use    Smoking status: Never     Passive exposure: Never    Smokeless tobacco: Never   Vaping Use    Vaping status: Never Used   Substance Use Topics    Alcohol use: Not Currently    Drug use: Not Currently   [2] No current outpatient medications on file.